# Patient Record
Sex: MALE | Race: WHITE | NOT HISPANIC OR LATINO
[De-identification: names, ages, dates, MRNs, and addresses within clinical notes are randomized per-mention and may not be internally consistent; named-entity substitution may affect disease eponyms.]

---

## 2017-03-29 ENCOUNTER — CHARTING TRANS (OUTPATIENT)
Dept: OTHER | Age: 22
End: 2017-03-29

## 2018-11-05 VITALS
TEMPERATURE: 97.6 F | HEART RATE: 72 BPM | SYSTOLIC BLOOD PRESSURE: 146 MMHG | RESPIRATION RATE: 20 BRPM | DIASTOLIC BLOOD PRESSURE: 52 MMHG | WEIGHT: 151 LBS

## 2019-04-28 ENCOUNTER — OFFICE VISIT (OUTPATIENT)
Dept: URGENT CARE | Age: 24
End: 2019-04-28

## 2019-04-28 VITALS
OXYGEN SATURATION: 99 % | WEIGHT: 156.31 LBS | BODY MASS INDEX: 21.88 KG/M2 | HEIGHT: 71 IN | TEMPERATURE: 98.9 F | DIASTOLIC BLOOD PRESSURE: 71 MMHG | RESPIRATION RATE: 16 BRPM | SYSTOLIC BLOOD PRESSURE: 121 MMHG | HEART RATE: 57 BPM

## 2019-04-28 DIAGNOSIS — J06.9 ACUTE UPPER RESPIRATORY INFECTION, UNSPECIFIED: Primary | ICD-10-CM

## 2019-04-28 PROCEDURE — 99214 OFFICE O/P EST MOD 30 MIN: CPT | Performed by: PHYSICIAN ASSISTANT

## 2019-04-28 RX ORDER — AMOXICILLIN AND CLAVULANATE POTASSIUM 875; 125 MG/1; MG/1
1 TABLET, FILM COATED ORAL 2 TIMES DAILY
Qty: 14 TABLET | Refills: 0 | Status: SHIPPED | OUTPATIENT
Start: 2019-04-28 | End: 2019-05-05

## 2019-04-28 ASSESSMENT — ENCOUNTER SYMPTOMS
SORE THROAT: 1
CONSTITUTIONAL NEGATIVE: 1
COUGH: 1
SPUTUM PRODUCTION: 1

## 2022-06-15 ENCOUNTER — TRANSFERRED RECORDS (OUTPATIENT)
Dept: HEALTH INFORMATION MANAGEMENT | Facility: CLINIC | Age: 27
End: 2022-06-15
Payer: COMMERCIAL

## 2022-06-17 ENCOUNTER — MEDICAL CORRESPONDENCE (OUTPATIENT)
Dept: HEALTH INFORMATION MANAGEMENT | Facility: CLINIC | Age: 27
End: 2022-06-17

## 2022-06-17 ENCOUNTER — TRANSCRIBE ORDERS (OUTPATIENT)
Dept: OTHER | Age: 27
End: 2022-06-17

## 2022-06-17 ENCOUNTER — OFFICE VISIT (OUTPATIENT)
Dept: OTOLARYNGOLOGY | Facility: CLINIC | Age: 27
End: 2022-06-17
Attending: NURSE PRACTITIONER
Payer: COMMERCIAL

## 2022-06-17 VITALS — SYSTOLIC BLOOD PRESSURE: 130 MMHG | HEART RATE: 65 BPM | DIASTOLIC BLOOD PRESSURE: 78 MMHG

## 2022-06-17 DIAGNOSIS — H61.23 BILATERAL IMPACTED CERUMEN: ICD-10-CM

## 2022-06-17 DIAGNOSIS — H92.02 OTALGIA, LEFT: ICD-10-CM

## 2022-06-17 DIAGNOSIS — H92.02 OTALGIA, LEFT: Primary | ICD-10-CM

## 2022-06-17 DIAGNOSIS — H60.392 INFECTIVE OTITIS EXTERNA, LEFT: Primary | ICD-10-CM

## 2022-06-17 LAB
KOH PREPARATION: ABNORMAL
KOH PREPARATION: ABNORMAL

## 2022-06-17 PROCEDURE — 99203 OFFICE O/P NEW LOW 30 MIN: CPT | Mod: 25 | Performed by: OTOLARYNGOLOGY

## 2022-06-17 PROCEDURE — 87220 TISSUE EXAM FOR FUNGI: CPT | Performed by: OTOLARYNGOLOGY

## 2022-06-17 PROCEDURE — 92504 EAR MICROSCOPY EXAMINATION: CPT | Performed by: OTOLARYNGOLOGY

## 2022-06-17 RX ORDER — CIPROFLOXACIN AND DEXAMETHASONE 3; 1 MG/ML; MG/ML
5 SUSPENSION/ DROPS AURICULAR (OTIC) 2 TIMES DAILY
Qty: 7.5 ML | Refills: 1 | Status: SHIPPED | OUTPATIENT
Start: 2022-06-17

## 2022-06-17 ASSESSMENT — ENCOUNTER SYMPTOMS
PHOTOPHOBIA: 0
SINUS PAIN: 0
BRUISES/BLEEDS EASILY: 0
CONSTITUTIONAL NEGATIVE: 1
TINGLING: 0
NAUSEA: 0
HEARTBURN: 0
STRIDOR: 0
VOMITING: 0
DOUBLE VISION: 0
SORE THROAT: 0
DIZZINESS: 0
HEADACHES: 0
BLURRED VISION: 0

## 2022-06-17 NOTE — PROGRESS NOTES
HPI       This pleasant patient is having ear drainage from his left ear. Started 3 weeks ago with ear pain, drainage, hearing impairment and tinnitus in the left. Experienced itching sensation and noticed blood in the secretion. He tried two courses of antibiotics, topical dexamethasone with limited  Benefit.    Review of Systems   Constitutional: Negative.    HENT: Positive for ear discharge, ear pain, hearing loss and tinnitus. Negative for congestion, nosebleeds, sinus pain and sore throat.    Eyes: Negative for blurred vision, double vision and photophobia.   Respiratory: Negative for stridor.    Gastrointestinal: Negative for heartburn, nausea and vomiting.   Skin: Negative.    Neurological: Negative for dizziness, tingling and headaches.   Endo/Heme/Allergies: Negative for environmental allergies. Does not bruise/bleed easily.       Physical Exam  Vitals reviewed.   Constitutional:       Appearance: Normal appearance.   HENT:      Head: Normocephalic and atraumatic.      Right Ear: Hearing, tympanic membrane, ear canal and external ear normal. There is impacted cerumen.      Left Ear: Decreased hearing noted. Drainage, swelling and tenderness present. There is impacted cerumen.      Nose: Nose normal. No septal deviation, mucosal edema or congestion.      Right Turbinates: Not enlarged or swollen.      Left Turbinates: Not enlarged or swollen.      Mouth/Throat:      Mouth: Mucous membranes are moist.      Pharynx: Oropharynx is clear. Uvula midline.   Eyes:      Extraocular Movements: Extraocular movements intact.      Pupils: Pupils are equal, round, and reactive to light.   Neurological:      Mental Status: He is alert.       A/P  Rohit is having left otitis externa. His ear canals are narrow. Left ear Canal: Sample was sent to [facility] treated fungal evaluation. I will give him a topical Cipro+Dexam x2 for 2 weeks and see him in the f/u. In the mean time, he will keep his ear dry. His questions were  answered.

## 2022-06-17 NOTE — PROGRESS NOTES
Chief Complaint   Patient presents with     Consult     Left ear, Seen by primary 2 1/2 weeks ago for inflamed ear, few days later ear infection. Bleeding 1 week ago and told ruptured ear drum. Now ringing starts in the am and feeling clogged. No pain.

## 2022-06-22 ENCOUNTER — TELEPHONE (OUTPATIENT)
Dept: OTOLARYNGOLOGY | Facility: CLINIC | Age: 27
End: 2022-06-22

## 2022-06-22 DIAGNOSIS — H60.392 INFECTIVE OTITIS EXTERNA, LEFT: Primary | ICD-10-CM

## 2022-06-22 RX ORDER — CLOTRIMAZOLE 1 G/ML
SOLUTION TOPICAL
Qty: 15 ML | Refills: 1 | Status: SHIPPED | OUTPATIENT
Start: 2022-06-22

## 2022-06-22 RX ORDER — OFLOXACIN 3 MG/ML
2 SOLUTION/ DROPS OPHTHALMIC 2 TIMES DAILY
Qty: 3 ML | Refills: 0 | Status: SHIPPED | OUTPATIENT
Start: 2022-06-22 | End: 2022-07-06

## 2022-06-22 RX ORDER — PREDNISOLONE ACETATE 10 MG/ML
2 SUSPENSION/ DROPS OPHTHALMIC 2 TIMES DAILY
Qty: 3 ML | Refills: 0 | Status: SHIPPED | OUTPATIENT
Start: 2022-06-22 | End: 2022-07-06

## 2022-06-22 NOTE — TELEPHONE ENCOUNTER
----- Message from Juana Minor MD sent at 6/21/2022  5:14 PM CDT -----  Rocky Lucero,  Can you send topical Clotrimazole 2x for 3 weeks 4 drops to the left ear?  Seb

## 2022-06-22 NOTE — TELEPHONE ENCOUNTER
Phone call to pt with ear KOH results showing fungal growth, Dr Minor's recommendation to start clotrimazole ear drops. Pt verbalized agreement with plan, also asks that a substitution for CiproDex be sent due to cost of medication.  Prescriptions sent to [facility] as requested.  Jazmine Gray RN

## 2022-07-01 ENCOUNTER — OFFICE VISIT (OUTPATIENT)
Dept: OTOLARYNGOLOGY | Facility: CLINIC | Age: 27
End: 2022-07-01
Payer: COMMERCIAL

## 2022-07-01 VITALS — DIASTOLIC BLOOD PRESSURE: 70 MMHG | HEART RATE: 69 BPM | SYSTOLIC BLOOD PRESSURE: 130 MMHG

## 2022-07-01 DIAGNOSIS — B36.9 OTOMYCOSIS OF LEFT EAR: Primary | ICD-10-CM

## 2022-07-01 DIAGNOSIS — H62.42 OTOMYCOSIS OF LEFT EAR: Primary | ICD-10-CM

## 2022-07-01 PROCEDURE — 99213 OFFICE O/P EST LOW 20 MIN: CPT | Performed by: OTOLARYNGOLOGY

## 2022-07-01 NOTE — PROGRESS NOTES
No chief complaint on file.  HPI       This pleasant patient is here for the f/u. I am glad to see that he is doing well with no ear drainage from his left ear. Denies any pain, pressure, or drainage. He tried topical cipro+ Clotrimazole for 3 weeks.    Review of Systems   Constitutional: Negative.    HENT: Positive for ear discharge, ear pain, hearing loss and tinnitus. Negative for congestion, nosebleeds, sinus pain and sore throat.    Eyes: Negative for blurred vision, double vision and photophobia.   Respiratory: Negative for stridor.    Gastrointestinal: Negative for heartburn, nausea and vomiting.   Skin: Negative.    Neurological: Negative for dizziness, tingling and headaches.   Endo/Heme/Allergies: Negative for environmental allergies. Does not bruise/bleed easily.       Physical Exam  Vitals reviewed.   Constitutional:       Appearance: Normal appearance.   HENT:      Head: Normocephalic and atraumatic.      Right Ear: Hearing, tympanic membrane, ear canal and external ear normal. There is impacted cerumen.      Left Ear: Decreased hearing noted. Drainage, swelling and tenderness present. There is impacted cerumen.      Nose: Nose normal. No septal deviation, mucosal edema or congestion.      Right Turbinates: Not enlarged or swollen.      Left Turbinates: Not enlarged or swollen.      Mouth/Throat:      Mouth: Mucous membranes are moist.      Pharynx: Oropharynx is clear. Uvula midline.   Eyes:      Extraocular Movements: Extraocular movements intact.      Pupils: Pupils are equal, round, and reactive to light.   Neurological:      Mental Status: He is alert.       A/P  Rohit is having left otitis externa and KOH sample showed fungal elements too.  I will continue with topical Clotrimazole for 2 more weeks and see him in the f/u. In the mean time, he will keep his ear dry. His questions were answered.

## 2022-07-01 NOTE — LETTER
7/1/2022         RE: Rohit Day  [address]  [address]  [place] [address]        Dear Colleague,    Thank you for referring your patient, Rohit Day, to the [facility]. Please see a copy of my visit note below.    No chief complaint on file.  HPI       This pleasant patient is here for the f/u. I am glad to see that he is doing well with no ear drainage from his left ear. Denies any pain, pressure, or drainage. He tried topical cipro+ Clotrimazole for 3 weeks.    Review of Systems   Constitutional: Negative.    HENT: Positive for ear discharge, ear pain, hearing loss and tinnitus. Negative for congestion, nosebleeds, sinus pain and sore throat.    Eyes: Negative for blurred vision, double vision and photophobia.   Respiratory: Negative for stridor.    Gastrointestinal: Negative for heartburn, nausea and vomiting.   Skin: Negative.    Neurological: Negative for dizziness, tingling and headaches.   Endo/Heme/Allergies: Negative for environmental allergies. Does not bruise/bleed easily.       Physical Exam  Vitals reviewed.   Constitutional:       Appearance: Normal appearance.   HENT:      Head: Normocephalic and atraumatic.      Right Ear: Hearing, tympanic membrane, ear canal and external ear normal. There is impacted cerumen.      Left Ear: Decreased hearing noted. Drainage, swelling and tenderness present. There is impacted cerumen.      Nose: Nose normal. No septal deviation, mucosal edema or congestion.      Right Turbinates: Not enlarged or swollen.      Left Turbinates: Not enlarged or swollen.      Mouth/Throat:      Mouth: Mucous membranes are moist.      Pharynx: Oropharynx is clear. Uvula midline.   Eyes:      Extraocular Movements: Extraocular movements intact.      Pupils: Pupils are equal, round, and reactive to light.   Neurological:      Mental Status: He is alert.       A/P  Rohit is having left otitis externa and KOH sample showed fungal elements too.   I will continue with topical Clotrimazole for 2 more weeks and see him in the f/u. In the mean time, he will keep his ear dry. His questions were answered.          Again, thank you for allowing me to participate in the care of your patient.        Sincerely,        Juana Minor MD

## 2022-07-01 NOTE — NURSING NOTE
Rohit Day's chief complaint for this visit includes:  Chief Complaint   Patient presents with     Follow Up     Recheck lear ear, yeast infection. Now better     PCP: No Ref-Primary, Physician    Referring Provider:  No referring provider defined for this encounter.    /70   Pulse 69   Data Unavailable      No Known Allergies      Do you need any medication refills at today's visit?

## 2022-07-15 ENCOUNTER — OFFICE VISIT (OUTPATIENT)
Dept: OTOLARYNGOLOGY | Facility: CLINIC | Age: 27
End: 2022-07-15
Payer: COMMERCIAL

## 2022-07-15 VITALS — HEART RATE: 73 BPM | DIASTOLIC BLOOD PRESSURE: 87 MMHG | SYSTOLIC BLOOD PRESSURE: 144 MMHG

## 2022-07-15 DIAGNOSIS — B36.9 OTOMYCOSIS OF LEFT EAR: Primary | ICD-10-CM

## 2022-07-15 DIAGNOSIS — H62.42 OTOMYCOSIS OF LEFT EAR: Primary | ICD-10-CM

## 2022-07-15 PROCEDURE — 99213 OFFICE O/P EST LOW 20 MIN: CPT | Performed by: OTOLARYNGOLOGY

## 2022-07-15 NOTE — LETTER
7/15/2022         RE: Rohit Day  [address]  [address]  [place] [address]        Dear Colleague,    Thank you for referring your patient, Rohit Day, to the [facility]. Please see a copy of my visit note below.    Chief Complaint   Patient presents with     Follow Up     Recheck yeast infection in left ear. Using 3 drops daily, after putting in drops, he hears the ringing.    No chief complaint on file.  HPI       This pleasant patient is here for the f/u. I am glad to see that he is doing well with no ear drainage from his left ear. Continues to have plugged up sensation and tinnitus in his left ear since the infection started. Denies any pain, pressure, or drainage. He tried topical cipro+ Clotrimazole for 3 weeks.    Review of Systems   Constitutional: Negative.    HENT: Positive for ear discharge, ear pain, hearing loss and tinnitus. Negative for congestion, nosebleeds, sinus pain and sore throat.    Eyes: Negative for blurred vision, double vision and photophobia.   Respiratory: Negative for stridor.    Gastrointestinal: Negative for heartburn, nausea and vomiting.   Skin: Negative.    Neurological: Negative for dizziness, tingling and headaches.   Endo/Heme/Allergies: Negative for environmental allergies. Does not bruise/bleed easily.       Physical Exam  Vitals reviewed.   Constitutional:       Appearance: Normal appearance.   HENT:      Head: Normocephalic and atraumatic.      Right Ear: Hearing, tympanic membrane, ear canal and external ear normal. There is impacted cerumen.      Left Ear: Decreased hearing noted. No infection or drainage seen in the ear canal. TM is intact. There is impacted cerumen.      Nose: Nose normal. No septal deviation, mucosal edema or congestion.      Right Turbinates: Not enlarged or swollen.      Left Turbinates: Not enlarged or swollen.      Mouth/Throat:      Mouth: Mucous membranes are moist.      Pharynx: Oropharynx is clear.  Uvula midline.   Eyes:      Extraocular Movements: Extraocular movements intact.      Pupils: Pupils are equal, round, and reactive to light.   Neurological:      Mental Status: He is alert.       A/P  Rohit is doing much better in terms of left otitis externa.  I will stop the topical ear drops and see him in the f/u with a hearing test. In the mean time, he will keep his ear dry. His questions were answered.            Again, thank you for allowing me to participate in the care of your patient.        Sincerely,        Juana Minor MD

## 2022-07-15 NOTE — PROGRESS NOTES
Chief Complaint   Patient presents with     Follow Up     Recheck yeast infection in left ear. Using 3 drops daily, after putting in drops, he hears the ringing.    No chief complaint on file.  HPI       This pleasant patient is here for the f/u. I am glad to see that he is doing well with no ear drainage from his left ear. Continues to have plugged up sensation and tinnitus in his left ear since the infection started. Denies any pain, pressure, or drainage. He tried topical cipro+ Clotrimazole for 3 weeks.    Review of Systems   Constitutional: Negative.    HENT: Positive for ear discharge, ear pain, hearing loss and tinnitus. Negative for congestion, nosebleeds, sinus pain and sore throat.    Eyes: Negative for blurred vision, double vision and photophobia.   Respiratory: Negative for stridor.    Gastrointestinal: Negative for heartburn, nausea and vomiting.   Skin: Negative.    Neurological: Negative for dizziness, tingling and headaches.   Endo/Heme/Allergies: Negative for environmental allergies. Does not bruise/bleed easily.       Physical Exam  Vitals reviewed.   Constitutional:       Appearance: Normal appearance.   HENT:      Head: Normocephalic and atraumatic.      Right Ear: Hearing, tympanic membrane, ear canal and external ear normal. There is impacted cerumen.      Left Ear: Decreased hearing noted. No infection or drainage seen in the ear canal. TM is intact. There is impacted cerumen.      Nose: Nose normal. No septal deviation, mucosal edema or congestion.      Right Turbinates: Not enlarged or swollen.      Left Turbinates: Not enlarged or swollen.      Mouth/Throat:      Mouth: Mucous membranes are moist.      Pharynx: Oropharynx is clear. Uvula midline.   Eyes:      Extraocular Movements: Extraocular movements intact.      Pupils: Pupils are equal, round, and reactive to light.   Neurological:      Mental Status: He is alert.       A/P  Rohit is doing much better in terms of left otitis externa.   I will stop the topical ear drops and see him in the f/u with a hearing test. In the mean time, he will keep his ear dry. His questions were answered.

## 2022-07-15 NOTE — NURSING NOTE
Rohit Day's chief complaint for this visit includes:  Chief Complaint   Patient presents with     Follow Up     Recheck yeast infection in left ear. Using 3 drops daily, after putting in drops, he hears the ringing.      PCP: No Ref-Primary, Physician    Referring Provider:  No referring provider defined for this encounter.    BP (!) 144/87   Pulse 73   Data Unavailable      No Known Allergies      Do you need any medication refills at today's visit?

## 2022-09-27 ENCOUNTER — OFFICE VISIT (OUTPATIENT)
Dept: AUDIOLOGY | Facility: CLINIC | Age: 27
End: 2022-09-27
Payer: COMMERCIAL

## 2022-09-27 DIAGNOSIS — H93.12 TINNITUS OF LEFT EAR: Primary | ICD-10-CM

## 2022-09-27 PROCEDURE — 92567 TYMPANOMETRY: CPT | Performed by: AUDIOLOGIST

## 2022-09-27 PROCEDURE — 92557 COMPREHENSIVE HEARING TEST: CPT | Performed by: AUDIOLOGIST

## 2022-09-27 NOTE — PROGRESS NOTES
AUDIOLOGY REPORT    SUMMARY: Audiology visit completed. See audiogram for results.    RECOMMENDATIONS: Follow-up with ENT.    Luigi Melchor  Doctor of Audiology  [place] License # [id number]

## 2022-09-28 ENCOUNTER — TELEPHONE (OUTPATIENT)
Dept: OTOLARYNGOLOGY | Facility: CLINIC | Age: 27
End: 2022-09-28

## 2022-09-28 NOTE — TELEPHONE ENCOUNTER
Rohit's hearing test is within normal limits. Tinnitus and aural fullness may continue up to 4 months. F/up as needed.   Seb

## 2022-09-28 NOTE — TELEPHONE ENCOUNTER
Phone call to pt, left message with normal audiogram results, asked pt to call back with further questions.  Jazmine Gray RN